# Patient Record
Sex: FEMALE | Race: WHITE | Employment: FULL TIME | URBAN - METROPOLITAN AREA
[De-identification: names, ages, dates, MRNs, and addresses within clinical notes are randomized per-mention and may not be internally consistent; named-entity substitution may affect disease eponyms.]

---

## 2024-08-23 ENCOUNTER — APPOINTMENT (OUTPATIENT)
Dept: CT IMAGING | Age: 59
End: 2024-08-23
Payer: COMMERCIAL

## 2024-08-23 ENCOUNTER — HOSPITAL ENCOUNTER (EMERGENCY)
Age: 59
Discharge: HOME | End: 2024-08-23
Attending: EMERGENCY MEDICINE
Payer: COMMERCIAL

## 2024-08-23 VITALS
HEIGHT: 68 IN | DIASTOLIC BLOOD PRESSURE: 75 MMHG | BODY MASS INDEX: 22.73 KG/M2 | HEART RATE: 81 BPM | SYSTOLIC BLOOD PRESSURE: 125 MMHG | WEIGHT: 150 LBS | RESPIRATION RATE: 15 BRPM | OXYGEN SATURATION: 100 % | TEMPERATURE: 98.3 F

## 2024-08-23 DIAGNOSIS — R55 SYNCOPE AND COLLAPSE: Primary | ICD-10-CM

## 2024-08-23 LAB
ANION GAP SERPL CALC-SCNC: 11 MMOL/L (ref 9–18)
BASOPHILS # BLD: 0 K/UL (ref 0–0.2)
BASOPHILS NFR BLD: 1 % (ref 0–2)
BUN SERPL-MCNC: 15 MG/DL (ref 6–23)
CALCIUM SERPL-MCNC: 9.4 MG/DL (ref 8.8–10.2)
CHLORIDE SERPL-SCNC: 103 MMOL/L (ref 98–107)
CO2 SERPL-SCNC: 26 MMOL/L (ref 20–28)
CREAT SERPL-MCNC: 0.83 MG/DL (ref 0.6–1.1)
DIFFERENTIAL METHOD BLD: ABNORMAL
EOSINOPHIL # BLD: 0.1 K/UL (ref 0–0.8)
EOSINOPHIL NFR BLD: 2 % (ref 0.5–7.8)
ERYTHROCYTE [DISTWIDTH] IN BLOOD BY AUTOMATED COUNT: 12 % (ref 11.9–14.6)
GLUCOSE SERPL-MCNC: 128 MG/DL (ref 70–99)
HCT VFR BLD AUTO: 39.9 % (ref 35.8–46.3)
HGB BLD-MCNC: 13.5 G/DL (ref 11.7–15.4)
IMM GRANULOCYTES # BLD AUTO: 0 K/UL (ref 0–0.5)
IMM GRANULOCYTES NFR BLD AUTO: 1 % (ref 0–5)
LYMPHOCYTES # BLD: 1.2 K/UL (ref 0.5–4.6)
LYMPHOCYTES NFR BLD: 28 % (ref 13–44)
MAGNESIUM SERPL-MCNC: 2.1 MG/DL (ref 1.8–2.4)
MCH RBC QN AUTO: 29.7 PG (ref 26.1–32.9)
MCHC RBC AUTO-ENTMCNC: 33.8 G/DL (ref 31.4–35)
MCV RBC AUTO: 87.7 FL (ref 82–102)
MONOCYTES # BLD: 0.4 K/UL (ref 0.1–1.3)
MONOCYTES NFR BLD: 9 % (ref 4–12)
NEUTS SEG # BLD: 2.6 K/UL (ref 1.7–8.2)
NEUTS SEG NFR BLD: 60 % (ref 43–78)
NRBC # BLD: 0 K/UL (ref 0–0.2)
PLATELET # BLD AUTO: 233 K/UL (ref 150–450)
PMV BLD AUTO: 9.2 FL (ref 9.4–12.3)
POTASSIUM SERPL-SCNC: 4 MMOL/L (ref 3.5–5.1)
RBC # BLD AUTO: 4.55 M/UL (ref 4.05–5.2)
SODIUM SERPL-SCNC: 140 MMOL/L (ref 136–145)
WBC # BLD AUTO: 4.4 K/UL (ref 4.3–11.1)

## 2024-08-23 PROCEDURE — 85025 COMPLETE CBC W/AUTO DIFF WBC: CPT

## 2024-08-23 PROCEDURE — 80048 BASIC METABOLIC PNL TOTAL CA: CPT

## 2024-08-23 PROCEDURE — 83735 ASSAY OF MAGNESIUM: CPT

## 2024-08-23 PROCEDURE — 70450 CT HEAD/BRAIN W/O DYE: CPT

## 2024-08-23 PROCEDURE — 99284 EMERGENCY DEPT VISIT MOD MDM: CPT

## 2024-08-23 RX ORDER — LEVOTHYROXINE SODIUM 0.03 MG/1
25 TABLET ORAL DAILY
COMMUNITY

## 2024-08-23 ASSESSMENT — PAIN DESCRIPTION - PAIN TYPE: TYPE: ACUTE PAIN

## 2024-08-23 ASSESSMENT — PAIN DESCRIPTION - LOCATION: LOCATION: HEAD;NECK

## 2024-08-23 ASSESSMENT — LIFESTYLE VARIABLES: HOW OFTEN DO YOU HAVE A DRINK CONTAINING ALCOHOL: NEVER

## 2024-08-23 ASSESSMENT — PAIN - FUNCTIONAL ASSESSMENT: PAIN_FUNCTIONAL_ASSESSMENT: 0-10

## 2024-08-23 ASSESSMENT — PAIN SCALES - GENERAL: PAINLEVEL_OUTOF10: 8

## 2024-08-23 NOTE — ED TRIAGE NOTES
Pt went to use the bathroom/ had a bowel movement and was leaving bathroom/ fell dizzy and woke up on hard wood floor/ c/o of headache and dizziness

## 2024-08-23 NOTE — ED NOTES
Patient mobility status  with no difficulty. Provider aware     I have reviewed discharge instructions with the patient.  The patient verbalized understanding.    Patient left ED via Discharge Method: ambulatory to Home with spouse.    Opportunity for questions and clarification provided.     Patient given 0 scripts.

## 2024-08-23 NOTE — ED PROVIDER NOTES
Emergency Department Provider Note       PCP: No primary care provider on file.   Age: 59 y.o.   Sex: female     DISPOSITION    Condition at Disposition: Data Unavailable     No diagnosis found.    Medical Decision Making     Patient being evaluated for her syncopal episode.  She does not report any chest pain or other cardiac symptoms concerning for ACS.  She is complaining of headache so CT of her head will be performed to rule out any intracranial cause as the source of her syncopal episode or as a result of head injury from the syncopal episode.  Currently asymptomatic and feeling much better.  Patient may have had a vasovagal episode.  However blood work be obtained to rule out any metabolic, electrolyte, or possible infectious cause.  ED Course as of 08/23/24 0822   Fri Aug 23, 2024   0820 BMP(!):    Sodium 140   Potassium 4.0   Chloride 103   CARBON DIOXIDE 26   Anion Gap 11   Glucose 128(!)   BUN,BUNPL 15   Creatinine 0.83   Est, Glom Filt Rate 81   Calcium 9.4  Glucose mildly elevated 128 but otherwise normal metabolic profile. [RADU]   0821 CBC with Auto Differential(!):    WBC 4.4   RBC 4.55   Hemoglobin Quant 13.5   Hematocrit 39.9   MCV 87.7   MCH 29.7   MCHC 33.8   RDW 12.0   Platelet Count 233   MPV 9.2(!)   Nucleated Red Blood Cells 0.00   Differential Type AUTOMATED   Neutrophils % 60   Lymphocyte % 28   Monocytes % 9   Eosinophils % 2   Basophils % 1   Immature Granulocytes % 1   Neutrophils Absolute 2.6   Lymphocytes Absolute 1.2   Monocytes Absolute 0.4   Eosinophils Absolute 0.1   Basophils Absolute 0.0   Immature Granulocytes Absolute 0.0  No leukocytosis, left shift, or anemia [RADU]   0821 CT Head W/O Contrast  Negative for any intracranial process [RADU]   0821 Patient's workup overall has been negative here.  She has had no further symptoms while here in the emergency department.  She feels good.  She feels comfortable with discharge.  She is to follow-up with her primary care physician.  She

## 2024-10-23 LAB
ESTIMATED AVERAGE GLUCOSE: 122.6
HBA1C MFR BLD: 5.9 %

## 2024-11-26 NOTE — PROGRESS NOTES
[facility]  Noninvasive Cardiologist, [facility]    *This dictation was completed with computer voice-recognition software. Please feel free to message me via Perfect[employer]/E[employer] Secure Chat or to call me with any questions regarding errors in grammar or syntax that may have resulted from its use.    Signed:  Alexi Sharpe MD  11/27/2024  10:34 AM

## 2024-11-27 ENCOUNTER — INITIAL CONSULT (OUTPATIENT)
Age: 59
End: 2024-11-27
Payer: COMMERCIAL

## 2024-11-27 VITALS
SYSTOLIC BLOOD PRESSURE: 142 MMHG | HEIGHT: 68 IN | HEART RATE: 60 BPM | WEIGHT: 153 LBS | DIASTOLIC BLOOD PRESSURE: 90 MMHG | BODY MASS INDEX: 23.19 KG/M2

## 2024-11-27 DIAGNOSIS — R55 SYNCOPE, UNSPECIFIED SYNCOPE TYPE: Primary | ICD-10-CM

## 2024-11-27 DIAGNOSIS — E78.2 MIXED HYPERLIPIDEMIA: ICD-10-CM

## 2024-11-27 DIAGNOSIS — R03.0 ELEVATED BLOOD PRESSURE READING: ICD-10-CM

## 2024-11-27 PROCEDURE — 99244 OFF/OP CNSLTJ NEW/EST MOD 40: CPT | Performed by: INTERNAL MEDICINE

## 2024-11-27 RX ORDER — ATORVASTATIN CALCIUM 40 MG/1
40 TABLET, FILM COATED ORAL DAILY
Qty: 90 TABLET | Refills: 0 | Status: SHIPPED | OUTPATIENT
Start: 2024-11-27

## 2024-11-27 RX ORDER — CETIRIZINE HYDROCHLORIDE 10 MG/1
10 TABLET ORAL DAILY
COMMUNITY

## 2024-11-27 NOTE — PATIENT INSTRUCTIONS
- Echocardiogram  - Wear your heart monitor  - Start Lipitor 40 mg daily  - Please check your blood pressure daily for the next week, keep a blood pressure log, and bring it to your next clinic visit. Only check your blood pressure after you have been seated for 5-10 minutes and not while in a high-anxiety state.   - Please bring your blood pressure cuff into a doctor's office or pharmacy every 6 months to check its accuracy  - For more tips on how to check your blood pressure accurately at home, please visit the American Heart Association website, here:  https://www.heart.org/en/health-topics/high-blood-pressure/understanding-blood-pressure-readings/monitoring-your-blood-pressure-at-home   As we discussed, a Mediterranean-style diet typically includes:  *plenty of fruits, vegetables, bread and other grains, potatoes, beans, nuts and seeds;  *olive oil as a primary fat source; and  *dairy products, eggs, fish and poultry in low to moderate amounts.  *Fish and poultry are more common than red meat in this diet. It also centers on minimally processed, plant-based foods. Wine may be consumed in low to moderate amounts, usually with meals. Fruit is a common dessert instead of sweets.  **Read more at: https://www.heart.org/en/healthy-living/healthy-eating/eat-smart/nutrition-basics/mediterranean-diet  We also discussed the American Heart Association recommendations for physical activity in adults, which include:  *Get at least 150 minutes per week of moderate-intensity aerobic activity or 75 minutes per week of vigorous aerobic activity, or a combination of both, preferably spread throughout the week.  *Add moderate- to high-intensity muscle-strengthening activity (such as resistance or weights) on at least 2 days per week.  *Spend less time sitting. Even light-intensity activity can offset some of the risks of being sedentary.  *Gain even more benefits by being active at least 300 minutes (5 hours) per week.  *Increase

## 2025-01-07 ENCOUNTER — TELEPHONE (OUTPATIENT)
Age: 60
End: 2025-01-07

## 2025-01-07 NOTE — TELEPHONE ENCOUNTER
----- Message from Dr. Alexi Sharpe MD sent at 1/7/2025  9:32 AM EST -----  Please let the patient know that her heart monitor was normal.      Spoke with pt informed her of Dr. Sharep's response. Pt voiced her understanding.

## 2025-01-20 ENCOUNTER — TELEPHONE (OUTPATIENT)
Age: 60
End: 2025-01-20

## 2025-01-20 DIAGNOSIS — E78.2 MIXED HYPERLIPIDEMIA: Primary | ICD-10-CM

## 2025-01-20 NOTE — TELEPHONE ENCOUNTER
Spoke with pt informed her of Dr. Sharpe's response. Pt stated since her test was normal why did she have to keep her appt. I informed her since Dr. Sharpe had prescribed Lipitor. He would like to keep a check on her Cholesterol levels.  Told pt I would speak with Dr. Sharpe. Per Dell it's ok for her to follow up with him in 6 months to check her lab work. Informed pt that the order for her blood work had been placed.  Appt  rescheduled for June 17 @ 3:30. Pt voiced her understanding.        ----- Message from Dr. Alexi Sharpe MD sent at 1/20/2025  3:08 PM EST -----  Please let the patient know that his echocardiogram was unremarkable, with normal pump function and normal function of the heart's valves.

## 2025-03-21 RX ORDER — ATORVASTATIN CALCIUM 40 MG/1
40 TABLET, FILM COATED ORAL DAILY
Qty: 90 TABLET | Refills: 1 | Status: SHIPPED | OUTPATIENT
Start: 2025-03-21

## 2025-03-21 NOTE — TELEPHONE ENCOUNTER
Pt is calling asking if she is suppose to continue taking Atorvastatin  If so she needs a refill , but wanted to check with the doctor  Please call pt and let her know  MEDICATION REFILL REQUEST      Name of Medication:  Atorvastatin  Dose:  40 mg  Frequency:  QD  Quantity:  90  Days' supply:  90 with 3 refills      Pharmacy Name/Location:  [phone]

## 2025-03-21 NOTE — TELEPHONE ENCOUNTER
Requested Prescriptions     Pending Prescriptions Disp Refills    atorvastatin (LIPITOR) 40 MG tablet 90 tablet 1     Sig: Take 1 tablet by mouth daily              Spoke with pt informed .Since Dr. Sharpe would like to keep a check her Cholesterol level. She needed to stay on the Lipitor. Informed pt to go ahead and get that blood work done. Dr Sharpe will go over her blood work at her appt in June. Pt voiced her Understanding.

## 2025-06-10 DIAGNOSIS — E78.2 MIXED HYPERLIPIDEMIA: ICD-10-CM

## 2025-06-10 LAB
CHOLEST SERPL-MCNC: 163 MG/DL (ref 0–200)
HDLC SERPL-MCNC: 65 MG/DL (ref 40–60)
HDLC SERPL: 2.5 (ref 0–5)
LDLC SERPL CALC-MCNC: 86 MG/DL (ref 0–100)
TRIGL SERPL-MCNC: 61 MG/DL (ref 0–150)
VLDLC SERPL CALC-MCNC: 12 MG/DL (ref 6–23)

## 2025-06-17 ENCOUNTER — OFFICE VISIT (OUTPATIENT)
Age: 60
End: 2025-06-17
Payer: COMMERCIAL

## 2025-06-17 VITALS
HEIGHT: 68 IN | DIASTOLIC BLOOD PRESSURE: 74 MMHG | HEART RATE: 72 BPM | WEIGHT: 145 LBS | BODY MASS INDEX: 21.98 KG/M2 | SYSTOLIC BLOOD PRESSURE: 120 MMHG

## 2025-06-17 DIAGNOSIS — E78.2 MIXED HYPERLIPIDEMIA: Primary | ICD-10-CM

## 2025-06-17 DIAGNOSIS — R55 VASOVAGAL SYNCOPE: ICD-10-CM

## 2025-06-17 PROCEDURE — G2211 COMPLEX E/M VISIT ADD ON: HCPCS | Performed by: INTERNAL MEDICINE

## 2025-06-17 PROCEDURE — 99214 OFFICE O/P EST MOD 30 MIN: CPT | Performed by: INTERNAL MEDICINE

## 2025-06-17 RX ORDER — ATORVASTATIN CALCIUM 40 MG/1
40 TABLET, FILM COATED ORAL DAILY
Qty: 90 TABLET | Refills: 3 | Status: SHIPPED | OUTPATIENT
Start: 2025-06-17

## 2025-06-17 NOTE — PROGRESS NOTES
MD Drake       Objective     Physical Exam  Vitals:    06/17/25 1542   BP: 120/74   Pulse: 72   Weight: 65.8 kg (145 lb)   Height: 1.727 m (5' 8\")     BP Readings from Last 3 Encounters:   06/17/25 120/74   01/20/25 (!) 142/90   11/27/24 (!) 142/90     Wt Readings from Last 3 Encounters:   06/17/25 65.8 kg (145 lb)   01/20/25 69.4 kg (153 lb)   11/27/24 69.4 kg (153 lb)     Estimated body mass index is 22.05 kg/m² as calculated from the following:    Height as of this encounter: 1.727 m (5' 8\").    Weight as of this encounter: 65.8 kg (145 lb).    CONSTITUTIONAL: Well nourished, pleasant and comfortable, no acute distress  HEENT: Mucous membranes moist, unremarkable without jaundice or pallor  NECK: Supple, jugular venous pressure less than 10 cm H2O  RESPIRATORY: Chest expands symmetrically with normal respiratory effort.  Lungs are clear to auscultation without rales or wheezing.   CARDIOVASCULAR: Regular rate and rhythm, normal S1 and S2, no murmurs rubs or gallops, 2+ dorsalis pedis and radial pulses, no edema  GASTROINTESTINAL: soft, non-tender, not distended.  MUSCULOSKELETAL/INTEGUM:  Warm without clubbing, cyanosis.  NEUROLOGIC: Alert, oriented x3 and grossly non focal moving all extremities.  PSYCHIATRIC: Pleasant and cooperative, Affect appropriate to situation.    Data Review:    Notable labs are:  No results found for: \"CBC\"  No results found for: \"BMP\"  No components found for: \"TROPONIN\"  No results found for: \"BNP\", \"PROBNP\"  Lab Results   Component Value Date    CHOL 163 06/10/2025     Lab Results   Component Value Date    TRIG 61 06/10/2025     Lab Results   Component Value Date    HDL 65 (H) 06/10/2025     No components found for: \"LDLCHOLESTEROL\", \"LDLCALC\"  Lab Results   Component Value Date    VLDL 12 06/10/2025     Lab Results   Component Value Date    CHOLHDLRATIO 2.5 06/10/2025      No components found for: \"LFT\"     Cardiovascular testing:   - TTE 1/25: Normal left and right